# Patient Record
Sex: MALE | Race: WHITE | NOT HISPANIC OR LATINO | URBAN - METROPOLITAN AREA
[De-identification: names, ages, dates, MRNs, and addresses within clinical notes are randomized per-mention and may not be internally consistent; named-entity substitution may affect disease eponyms.]

---

## 2020-02-26 ENCOUNTER — IMPORTED ENCOUNTER (OUTPATIENT)
Dept: URBAN - METROPOLITAN AREA CLINIC 31 | Facility: CLINIC | Age: 85
End: 2020-02-26

## 2020-02-26 PROBLEM — H18.453: Noted: 2020-02-26

## 2020-02-26 PROBLEM — Z96.1: Noted: 2020-02-26

## 2020-02-26 PROBLEM — H25.811: Noted: 2020-02-26

## 2020-02-26 PROCEDURE — 92025 CPTRIZED CORNEAL TOPOGRAPHY: CPT

## 2020-02-26 PROCEDURE — 92004 COMPRE OPH EXAM NEW PT 1/>: CPT

## 2020-02-26 NOTE — PATIENT DISCUSSION
1.  Discussed nodules and induced astigmatism. Treatment options discussed including observation vs excision. OD monitorOS risks and benefits of SK to remove nodules discussed including infection recurrence Schedule SK with [facility] OS. 2. Pseudophakia OS - IOL stable. Monitor for changes in vision. 3. Combined Types of Cataract OD: Explained how cataracts can effect vision. Recommend clinical observation. The patient was advised to contact us if any change or worsening of vision.

## 2020-03-03 ENCOUNTER — IMPORTED ENCOUNTER (OUTPATIENT)
Dept: URBAN - METROPOLITAN AREA CLINIC 31 | Facility: CLINIC | Age: 85
End: 2020-03-03

## 2020-03-17 ENCOUNTER — IMPORTED ENCOUNTER (OUTPATIENT)
Dept: URBAN - METROPOLITAN AREA CLINIC 31 | Facility: CLINIC | Age: 85
End: 2020-03-17

## 2020-03-17 PROBLEM — Z98.89: Noted: 2020-03-17

## 2020-03-17 PROCEDURE — 99024 POSTOP FOLLOW-UP VISIT: CPT

## 2020-03-17 NOTE — PATIENT DISCUSSION
Post Operative: Doing well po drops as instructed tears prn. Call with any problems. DC moxifloxacin Lotemax taper to off tears 4x/dReturn for an appointment in 3 weeks for post op refraction. with Dr. Amaris Ivan.

## 2022-04-02 ASSESSMENT — VISUAL ACUITY
OS_CC: 20/25-2
OD_CC: 20/30+2
OS_CC: 20/20-3
OD_CC: 20/25-2

## 2023-10-23 ENCOUNTER — CONSULTATION/EVALUATION (OUTPATIENT)
Dept: URBAN - METROPOLITAN AREA CLINIC 39 | Facility: CLINIC | Age: 88
End: 2023-10-23

## 2023-10-23 DIAGNOSIS — H18.513: ICD-10-CM

## 2023-10-23 DIAGNOSIS — E11.9: ICD-10-CM

## 2023-10-23 DIAGNOSIS — H18.453: ICD-10-CM

## 2023-10-23 DIAGNOSIS — H25.811: ICD-10-CM

## 2023-10-23 DIAGNOSIS — H35.3131: ICD-10-CM

## 2023-10-23 PROCEDURE — 92004 COMPRE OPH EXAM NEW PT 1/>: CPT

## 2023-10-23 PROCEDURE — V2799PMN IMPRIMIS PRED-MOXI-NEPAF 5ML

## 2023-10-23 PROCEDURE — 92134 CPTRZ OPH DX IMG PST SGM RTA: CPT

## 2023-10-23 PROCEDURE — 92136 OPHTHALMIC BIOMETRY: CPT

## 2023-10-23 PROCEDURE — 92286 ANT SGM IMG I&R SPECLR MIC: CPT

## 2023-10-23 ASSESSMENT — VISUAL ACUITY
OD_CC: J6
OD_SC: 20/30
OD_RAM: 20/25-2
OS_CC: J2
OD_SC: J12
OS_SC: 20/40+1
OS_SC: J12
OD_BAT: 20/80

## 2023-10-23 ASSESSMENT — TONOMETRY
OD_IOP_MMHG: 12
OS_IOP_MMHG: 14

## 2023-10-31 ENCOUNTER — PRE-OP/H&P (OUTPATIENT)
Dept: URBAN - METROPOLITAN AREA SURGERY 14 | Facility: SURGERY | Age: 88
End: 2023-10-31

## 2023-10-31 ENCOUNTER — SURGERY/PROCEDURE (OUTPATIENT)
Dept: URBAN - METROPOLITAN AREA CLINIC 39 | Facility: CLINIC | Age: 88
End: 2023-10-31

## 2023-10-31 ENCOUNTER — POST-OP (OUTPATIENT)
Dept: URBAN - METROPOLITAN AREA CLINIC 39 | Facility: CLINIC | Age: 88
End: 2023-10-31

## 2023-10-31 DIAGNOSIS — Z96.1: ICD-10-CM

## 2023-10-31 DIAGNOSIS — H25.811: ICD-10-CM

## 2023-10-31 PROCEDURE — 99211T TECH SERVICE

## 2023-10-31 PROCEDURE — 6698454AV REMOVE CATARACT, INSERT ADVANCED LENS (SX ONLY): Mod: 54,RT

## 2023-10-31 PROCEDURE — 66999LNSR LENSAR LASER FOR CAT SX

## 2023-10-31 PROCEDURE — 65772LRI LRI DURING CAT SX

## 2023-10-31 ASSESSMENT — TONOMETRY: OD_IOP_MMHG: 13

## 2023-10-31 ASSESSMENT — VISUAL ACUITY: OD_SC: 20/50

## 2023-11-02 ENCOUNTER — POST-OP (OUTPATIENT)
Dept: URBAN - METROPOLITAN AREA CLINIC 46 | Facility: CLINIC | Age: 88
End: 2023-11-02

## 2023-11-02 DIAGNOSIS — Z96.1: ICD-10-CM

## 2023-11-02 PROCEDURE — 99024 POSTOP FOLLOW-UP VISIT: CPT

## 2023-11-02 ASSESSMENT — VISUAL ACUITY
OD_SC: J10
OS_SC: 20/25
OS_SC: J5
OD_SC: 20/60-1

## 2023-11-02 ASSESSMENT — TONOMETRY
OD_IOP_MMHG: 11
OS_IOP_MMHG: 10

## 2023-11-16 ENCOUNTER — POST-OP (OUTPATIENT)
Dept: URBAN - METROPOLITAN AREA CLINIC 46 | Facility: CLINIC | Age: 88
End: 2023-11-16

## 2023-11-16 DIAGNOSIS — Z96.1: ICD-10-CM

## 2023-11-16 PROCEDURE — 99024 POSTOP FOLLOW-UP VISIT: CPT

## 2023-11-16 ASSESSMENT — TONOMETRY
OD_IOP_MMHG: 14
OS_IOP_MMHG: 9

## 2023-11-16 ASSESSMENT — VISUAL ACUITY
OS_SC: 20/30
OU_SC: 20/25-2
OU_SC: J3
OS_SC: J3
OD_SC: 20/30-2
OD_SC: J6

## 2023-12-21 ENCOUNTER — POST-OP (OUTPATIENT)
Dept: URBAN - METROPOLITAN AREA CLINIC 46 | Facility: CLINIC | Age: 88
End: 2023-12-21

## 2023-12-21 DIAGNOSIS — Z96.1: ICD-10-CM

## 2023-12-21 PROCEDURE — 99024 POSTOP FOLLOW-UP VISIT: CPT

## 2023-12-21 ASSESSMENT — VISUAL ACUITY
OS_SC: J3
OS_SC: 20/25
OD_SC: J2
OD_SC: 20/25

## 2023-12-21 ASSESSMENT — TONOMETRY
OS_IOP_MMHG: 10
OD_IOP_MMHG: 12